# Patient Record
Sex: FEMALE | Race: OTHER | Employment: STUDENT | URBAN - NONMETROPOLITAN AREA
[De-identification: names, ages, dates, MRNs, and addresses within clinical notes are randomized per-mention and may not be internally consistent; named-entity substitution may affect disease eponyms.]

---

## 2021-09-03 ENCOUNTER — HOSPITAL ENCOUNTER (EMERGENCY)
Age: 21
Discharge: HOME | End: 2021-09-03
Payer: COMMERCIAL

## 2021-09-03 VITALS
HEIGHT: 62 IN | OXYGEN SATURATION: 98 % | DIASTOLIC BLOOD PRESSURE: 60 MMHG | SYSTOLIC BLOOD PRESSURE: 109 MMHG | RESPIRATION RATE: 18 BRPM | BODY MASS INDEX: 18.77 KG/M2 | WEIGHT: 102 LBS | TEMPERATURE: 100.2 F | HEART RATE: 102 BPM

## 2021-09-03 DIAGNOSIS — J03.90 ACUTE TONSILLITIS, UNSPECIFIED ETIOLOGY: Primary | ICD-10-CM

## 2021-09-03 LAB
GROUP A STREP CULTURE, REFLEX: NEGATIVE
REFLEX THROAT C + S: NORMAL

## 2021-09-03 PROCEDURE — 99213 OFFICE O/P EST LOW 20 MIN: CPT

## 2021-09-03 PROCEDURE — 87070 CULTURE OTHR SPECIMN AEROBIC: CPT

## 2021-09-03 PROCEDURE — 87880 STREP A ASSAY W/OPTIC: CPT

## 2021-09-03 PROCEDURE — 99202 OFFICE O/P NEW SF 15 MIN: CPT | Performed by: EMERGENCY MEDICINE

## 2021-09-03 RX ORDER — AMOXICILLIN 875 MG/1
875 TABLET, COATED ORAL 2 TIMES DAILY
Qty: 20 TABLET | Refills: 0 | Status: SHIPPED | OUTPATIENT
Start: 2021-09-03 | End: 2021-09-03

## 2021-09-03 RX ORDER — AMOXICILLIN 875 MG/1
875 TABLET, COATED ORAL 2 TIMES DAILY
Qty: 20 TABLET | Refills: 0 | Status: SHIPPED | OUTPATIENT
Start: 2021-09-03 | End: 2021-09-13

## 2021-09-03 ASSESSMENT — PAIN DESCRIPTION - PROGRESSION: CLINICAL_PROGRESSION: GRADUALLY WORSENING

## 2021-09-03 ASSESSMENT — ENCOUNTER SYMPTOMS
SHORTNESS OF BREATH: 0
COUGH: 0
ABDOMINAL PAIN: 0
COLOR CHANGE: 0
SORE THROAT: 1
TROUBLE SWALLOWING: 0

## 2021-09-03 ASSESSMENT — PAIN SCALES - GENERAL: PAINLEVEL_OUTOF10: 9

## 2021-09-03 ASSESSMENT — PAIN DESCRIPTION - LOCATION: LOCATION: THROAT

## 2021-09-03 ASSESSMENT — PAIN DESCRIPTION - DESCRIPTORS: DESCRIPTORS: ACHING;SORE

## 2021-09-03 ASSESSMENT — PAIN - FUNCTIONAL ASSESSMENT: PAIN_FUNCTIONAL_ASSESSMENT: ACTIVITIES ARE NOT PREVENTED

## 2021-09-03 ASSESSMENT — PAIN DESCRIPTION - FREQUENCY: FREQUENCY: CONTINUOUS

## 2021-09-03 ASSESSMENT — PAIN DESCRIPTION - PAIN TYPE: TYPE: ACUTE PAIN

## 2021-09-03 NOTE — ED NOTES
Pt discharged. Pt verbalized understanding of discharge instructions and script. Pt walked out per self in stable condition.      Mary Brownlee LPN  05/92/80 [id number]

## 2021-09-03 NOTE — ED PROVIDER NOTES
[facility]  Urgent Care Encounter       CHIEF COMPLAINT       Chief Complaint   Patient presents with    Lymphadenopathy     right       Nurses Notes reviewed and I agree except as noted in the HPI. HISTORY OF PRESENT ILLNESS   Susan Wilkins is a 24 y.o. female who presents for complaints of sore throat. Patient also has swollen lymph node on the right side. States her pain is a 9 on a 10 scale. Symptoms x2 days. Patient is a  for a local college. Another member on the team has tested positive for strep throat. HPI    REVIEW OF SYSTEMS     Review of Systems   Constitutional: Negative for fatigue and fever. HENT: Positive for sore throat. Negative for trouble swallowing. Respiratory: Negative for cough and shortness of breath. Gastrointestinal: Negative for abdominal pain. Skin: Negative for color change. Neurological: Negative for dizziness and light-headedness. Psychiatric/Behavioral: Negative for behavioral problems. PAST MEDICAL HISTORY   History reviewed. No pertinent past medical history. SURGICALHISTORY     Patient  has no past surgical history on file. CURRENT MEDICATIONS       Discharge Medication List as of 9/3/2021 11:18 AM          ALLERGIES     Patient is has No Known Allergies. Patients   There is no immunization history on file for this patient. FAMILY HISTORY     Patient's family history is not on file. SOCIAL HISTORY     Patient  reports that she has never smoked. She has never used smokeless tobacco. She reports that she does not drink alcohol and does not use drugs. PHYSICAL EXAM     ED TRIAGE VITALS  BP: 109/60, Temp: 100.2 °F (37.9 °C), Heart Rate: (!) 102, Resp: 18, SpO2: 98 %,Estimated body mass index is 18.66 kg/m² as calculated from the following:    Height as of this encounter: 5' 2\" (1.575 m). Weight as of this encounter: 102 lb (46.3 kg). ,Patient's last menstrual period was or worsening symptoms. PATIENT REFERRED TO:  No primary care provider on file. No primary physician on file.       DISCHARGE MEDICATIONS:  Discharge Medication List as of 9/3/2021 11:18 AM      START taking these medications    Details   amoxicillin (AMOXIL) 875 MG tablet Take 1 tablet by mouth 2 times daily for 10 days, Disp-20 tablet, R-0Normal             Discharge Medication List as of 9/3/2021 11:18 AM          Discharge Medication List as of 9/3/2021 11:18 AM          MERA Woods CNP    (Please note that portions of this note were completed with a voice recognition program. Efforts were made to edit the dictations but occasionally words are mis-transcribed.)          MERA Woods CNP  09/03/21 [id number]

## 2021-09-03 NOTE — ED TRIAGE NOTES
Pt to [facility] ambulatory with a sore throat and swollen lymph nodes. This started on 09/01/21.

## 2021-09-05 LAB — THROAT/NOSE CULTURE: NORMAL

## 2022-05-23 ENCOUNTER — TELEPHONE (OUTPATIENT)
Dept: FAMILY MEDICINE CLINIC | Age: 22
End: 2022-05-23

## 2022-05-23 DIAGNOSIS — M79.671 RIGHT FOOT PAIN: Primary | ICD-10-CM

## 2022-05-25 ENCOUNTER — HOSPITAL ENCOUNTER (OUTPATIENT)
Age: 22
Discharge: HOME | End: 2022-05-25
Payer: COMMERCIAL

## 2022-05-25 ENCOUNTER — HOSPITAL ENCOUNTER (OUTPATIENT)
Dept: GENERAL RADIOLOGY | Age: 22
Discharge: HOME | End: 2022-05-25
Payer: COMMERCIAL

## 2022-05-25 DIAGNOSIS — M79.671 RIGHT FOOT PAIN: ICD-10-CM

## 2022-05-25 PROCEDURE — 73630 X-RAY EXAM OF FOOT: CPT

## 2022-07-11 ENCOUNTER — TELEPHONE (OUTPATIENT)
Dept: FAMILY MEDICINE CLINIC | Age: 22
End: 2022-07-11

## 2022-07-11 DIAGNOSIS — M79.671 RIGHT FOOT PAIN: Primary | ICD-10-CM

## 2022-07-11 NOTE — TELEPHONE ENCOUNTER
JAVIER MCCRAY [facility] athlete    Continued right foot pain  In [place] currently. Referral placed.     Please fax referral

## 2022-07-12 NOTE — TELEPHONE ENCOUNTER
Called and spoke with Burnett Cockayne, who is in patient's contacts due to not be able to get ahold of patient. Explained to Burnett Cockayne that we faxed a referral to  at the [facility] at [facility] in [place]. Also gave Burnett Cockayne the phone number for her to call and set up an appointment . Burnett Cockayne verbalized understanding. Fax number [phone], Phone number [phone].

## 2023-08-04 ENCOUNTER — HOSPITAL ENCOUNTER (EMERGENCY)
Age: 23
Discharge: HOME | End: 2023-08-04
Payer: COMMERCIAL

## 2023-08-04 VITALS
HEART RATE: 68 BPM | DIASTOLIC BLOOD PRESSURE: 62 MMHG | TEMPERATURE: 98.4 F | RESPIRATION RATE: 16 BRPM | OXYGEN SATURATION: 99 % | SYSTOLIC BLOOD PRESSURE: 97 MMHG

## 2023-08-04 DIAGNOSIS — Z02.5 SPORTS PHYSICAL: Primary | ICD-10-CM

## 2023-08-04 PROCEDURE — 99202 OFFICE O/P NEW SF 15 MIN: CPT

## 2023-08-04 PROCEDURE — 9900000020 HC SPORTS PHYSICAL-SELF PAY

## 2023-08-04 NOTE — ED NOTES
Pt refused translation service. Speaks [address] [place] [address] well . Writes english.      Darcy Press, LPN  04/25/59 9483

## 2023-08-05 ASSESSMENT — ENCOUNTER SYMPTOMS
ABDOMINAL PAIN: 0
COUGH: 0
NAUSEA: 0
WHEEZING: 0
CONSTIPATION: 0
BACK PAIN: 0
COLOR CHANGE: 0
SHORTNESS OF BREATH: 0
CHOKING: 0
DIARRHEA: 0
RHINORRHEA: 0
APNEA: 0
STRIDOR: 0
CHEST TIGHTNESS: 0
VOMITING: 0
SORE THROAT: 0

## 2023-08-08 NOTE — ED NOTES
Pt has called multiple times 8/7 and 8/8/23 stating she Rashid Alberta has one page (of 2 ) Of her physical forms\". Papers are enroute to be scanned to medical record media tab via interdepartmental mail. Unable to access at this time. Marta Valderrama CNP refills out physical exam form again and form faxed to [facility] for pt  as her mode of transportation is bike. Meeta clerical asked to please print and resticker new form and ensure form scanned to media tab please.  Pt instruct to  copy of form at [facility], [address] [place] [address]  08/08/23 8740